# Patient Record
Sex: FEMALE | Race: OTHER | HISPANIC OR LATINO | URBAN - METROPOLITAN AREA
[De-identification: names, ages, dates, MRNs, and addresses within clinical notes are randomized per-mention and may not be internally consistent; named-entity substitution may affect disease eponyms.]

---

## 2024-09-06 ENCOUNTER — EMERGENCY (EMERGENCY)
Facility: HOSPITAL | Age: 49
LOS: 1 days | Discharge: HOME | End: 2024-09-06
Attending: EMERGENCY MEDICINE | Admitting: EMERGENCY MEDICINE
Payer: MEDICAID

## 2024-09-06 VITALS
SYSTOLIC BLOOD PRESSURE: 171 MMHG | HEART RATE: 101 BPM | RESPIRATION RATE: 18 BRPM | OXYGEN SATURATION: 100 % | DIASTOLIC BLOOD PRESSURE: 101 MMHG | TEMPERATURE: 98 F

## 2024-09-06 VITALS
RESPIRATION RATE: 16 BRPM | SYSTOLIC BLOOD PRESSURE: 139 MMHG | TEMPERATURE: 98 F | OXYGEN SATURATION: 98 % | DIASTOLIC BLOOD PRESSURE: 81 MMHG | HEART RATE: 94 BPM

## 2024-09-06 DIAGNOSIS — L03.115 CELLULITIS OF RIGHT LOWER LIMB: ICD-10-CM

## 2024-09-06 DIAGNOSIS — R50.9 FEVER, UNSPECIFIED: ICD-10-CM

## 2024-09-06 DIAGNOSIS — Z20.822 CONTACT WITH AND (SUSPECTED) EXPOSURE TO COVID-19: ICD-10-CM

## 2024-09-06 DIAGNOSIS — R00.0 TACHYCARDIA, UNSPECIFIED: ICD-10-CM

## 2024-09-06 DIAGNOSIS — L03.116 CELLULITIS OF LEFT LOWER LIMB: ICD-10-CM

## 2024-09-06 LAB
ALBUMIN SERPL ELPH-MCNC: 3.1 G/DL — LOW (ref 3.4–5)
ALP SERPL-CCNC: 83 U/L — SIGNIFICANT CHANGE UP (ref 40–120)
ALT FLD-CCNC: 20 U/L — SIGNIFICANT CHANGE UP (ref 12–42)
ANION GAP SERPL CALC-SCNC: 7 MMOL/L — LOW (ref 9–16)
APPEARANCE UR: CLEAR — SIGNIFICANT CHANGE UP
APTT BLD: 28.6 SEC — SIGNIFICANT CHANGE UP (ref 24.5–35.6)
AST SERPL-CCNC: 41 U/L — HIGH (ref 15–37)
BASOPHILS # BLD AUTO: 0.04 K/UL — SIGNIFICANT CHANGE UP (ref 0–0.2)
BASOPHILS NFR BLD AUTO: 0.4 % — SIGNIFICANT CHANGE UP (ref 0–2)
BILIRUB SERPL-MCNC: 0.4 MG/DL — SIGNIFICANT CHANGE UP (ref 0.2–1.2)
BILIRUB UR-MCNC: NEGATIVE — SIGNIFICANT CHANGE UP
BUN SERPL-MCNC: 20 MG/DL — SIGNIFICANT CHANGE UP (ref 7–23)
CALCIUM SERPL-MCNC: 8.5 MG/DL — SIGNIFICANT CHANGE UP (ref 8.5–10.5)
CHLORIDE SERPL-SCNC: 107 MMOL/L — SIGNIFICANT CHANGE UP (ref 96–108)
CO2 SERPL-SCNC: 26 MMOL/L — SIGNIFICANT CHANGE UP (ref 22–31)
COLOR SPEC: YELLOW — SIGNIFICANT CHANGE UP
CREAT SERPL-MCNC: 0.78 MG/DL — SIGNIFICANT CHANGE UP (ref 0.5–1.3)
DIFF PNL FLD: NEGATIVE — SIGNIFICANT CHANGE UP
EGFR: 93 ML/MIN/1.73M2 — SIGNIFICANT CHANGE UP
EOSINOPHIL # BLD AUTO: 0.26 K/UL — SIGNIFICANT CHANGE UP (ref 0–0.5)
EOSINOPHIL NFR BLD AUTO: 2.5 % — SIGNIFICANT CHANGE UP (ref 0–6)
GLUCOSE SERPL-MCNC: 90 MG/DL — SIGNIFICANT CHANGE UP (ref 70–99)
GLUCOSE UR QL: NEGATIVE MG/DL — SIGNIFICANT CHANGE UP
HCG SERPL-ACNC: <1 MIU/ML — SIGNIFICANT CHANGE UP
HCT VFR BLD CALC: 35.5 % — SIGNIFICANT CHANGE UP (ref 34.5–45)
HGB BLD-MCNC: 11.4 G/DL — LOW (ref 11.5–15.5)
IMM GRANULOCYTES NFR BLD AUTO: 0.5 % — SIGNIFICANT CHANGE UP (ref 0–0.9)
INR BLD: 1.05 — SIGNIFICANT CHANGE UP (ref 0.85–1.18)
KETONES UR-MCNC: NEGATIVE MG/DL — SIGNIFICANT CHANGE UP
LACTATE BLDV-MCNC: 0.9 MMOL/L — SIGNIFICANT CHANGE UP (ref 0.5–2)
LEUKOCYTE ESTERASE UR-ACNC: NEGATIVE — SIGNIFICANT CHANGE UP
LYMPHOCYTES # BLD AUTO: 1.52 K/UL — SIGNIFICANT CHANGE UP (ref 1–3.3)
LYMPHOCYTES # BLD AUTO: 14.6 % — SIGNIFICANT CHANGE UP (ref 13–44)
MCHC RBC-ENTMCNC: 28.6 PG — SIGNIFICANT CHANGE UP (ref 27–34)
MCHC RBC-ENTMCNC: 32.1 GM/DL — SIGNIFICANT CHANGE UP (ref 32–36)
MCV RBC AUTO: 89.2 FL — SIGNIFICANT CHANGE UP (ref 80–100)
MONOCYTES # BLD AUTO: 0.69 K/UL — SIGNIFICANT CHANGE UP (ref 0–0.9)
MONOCYTES NFR BLD AUTO: 6.6 % — SIGNIFICANT CHANGE UP (ref 2–14)
NEUTROPHILS # BLD AUTO: 7.88 K/UL — HIGH (ref 1.8–7.4)
NEUTROPHILS NFR BLD AUTO: 75.4 % — SIGNIFICANT CHANGE UP (ref 43–77)
NITRITE UR-MCNC: NEGATIVE — SIGNIFICANT CHANGE UP
NRBC # BLD: 0 /100 WBCS — SIGNIFICANT CHANGE UP (ref 0–0)
NT-PROBNP SERPL-SCNC: 18 PG/ML — SIGNIFICANT CHANGE UP
PH UR: 8 — SIGNIFICANT CHANGE UP (ref 5–8)
PLATELET # BLD AUTO: 291 K/UL — SIGNIFICANT CHANGE UP (ref 150–400)
POTASSIUM SERPL-MCNC: 5 MMOL/L — SIGNIFICANT CHANGE UP (ref 3.5–5.3)
POTASSIUM SERPL-SCNC: 5 MMOL/L — SIGNIFICANT CHANGE UP (ref 3.5–5.3)
PROT SERPL-MCNC: 7 G/DL — SIGNIFICANT CHANGE UP (ref 6.4–8.2)
PROT UR-MCNC: NEGATIVE MG/DL — SIGNIFICANT CHANGE UP
PROTHROM AB SERPL-ACNC: 11.8 SEC — SIGNIFICANT CHANGE UP (ref 9.5–13)
RBC # BLD: 3.98 M/UL — SIGNIFICANT CHANGE UP (ref 3.8–5.2)
RBC # FLD: 15.9 % — HIGH (ref 10.3–14.5)
SODIUM SERPL-SCNC: 140 MMOL/L — SIGNIFICANT CHANGE UP (ref 132–145)
SP GR SPEC: 1.01 — SIGNIFICANT CHANGE UP (ref 1–1.03)
UROBILINOGEN FLD QL: 0.2 MG/DL — SIGNIFICANT CHANGE UP (ref 0.2–1)
WBC # BLD: 10.44 K/UL — SIGNIFICANT CHANGE UP (ref 3.8–10.5)
WBC # FLD AUTO: 10.44 K/UL — SIGNIFICANT CHANGE UP (ref 3.8–10.5)

## 2024-09-06 PROCEDURE — 93970 EXTREMITY STUDY: CPT | Mod: 26

## 2024-09-06 PROCEDURE — 99285 EMERGENCY DEPT VISIT HI MDM: CPT

## 2024-09-06 PROCEDURE — 71046 X-RAY EXAM CHEST 2 VIEWS: CPT | Mod: 26

## 2024-09-06 RX ORDER — SODIUM CHLORIDE 9 MG/ML
1550 INJECTION INTRAMUSCULAR; INTRAVENOUS; SUBCUTANEOUS ONCE
Refills: 0 | Status: COMPLETED | OUTPATIENT
Start: 2024-09-06 | End: 2024-09-06

## 2024-09-06 RX ORDER — ACETAMINOPHEN 325 MG/1
650 TABLET ORAL ONCE
Refills: 0 | Status: COMPLETED | OUTPATIENT
Start: 2024-09-06 | End: 2024-09-06

## 2024-09-06 RX ORDER — CEPHALEXIN 500 MG
1 CAPSULE ORAL
Qty: 15 | Refills: 0
Start: 2024-09-06 | End: 2024-09-10

## 2024-09-06 RX ORDER — VANCOMYCIN/0.9 % SOD CHLORIDE 1.75G/25
1000 PLASTIC BAG, INJECTION (ML) INTRAVENOUS ONCE
Refills: 0 | Status: COMPLETED | OUTPATIENT
Start: 2024-09-06 | End: 2024-09-06

## 2024-09-06 RX ORDER — ACETAMINOPHEN 325 MG/1
2 TABLET ORAL
Qty: 30 | Refills: 0
Start: 2024-09-06 | End: 2024-09-10

## 2024-09-06 RX ORDER — IBUPROFEN 600 MG
1 TABLET ORAL
Qty: 15 | Refills: 0
Start: 2024-09-06 | End: 2024-09-10

## 2024-09-06 RX ORDER — SULFAMETHOXAZOLE AND TRIMETHOPRIM 800; 160 MG/1; MG/1
1 TABLET ORAL
Qty: 10 | Refills: 0
Start: 2024-09-06 | End: 2024-09-10

## 2024-09-06 RX ADMIN — ACETAMINOPHEN 650 MILLIGRAM(S): 325 TABLET ORAL at 19:22

## 2024-09-06 RX ADMIN — SODIUM CHLORIDE 1550 MILLILITER(S): 9 INJECTION INTRAMUSCULAR; INTRAVENOUS; SUBCUTANEOUS at 15:44

## 2024-09-06 RX ADMIN — Medication 100 MILLIGRAM(S): at 15:44

## 2024-09-06 RX ADMIN — SODIUM CHLORIDE 1550 MILLILITER(S): 9 INJECTION INTRAMUSCULAR; INTRAVENOUS; SUBCUTANEOUS at 19:22

## 2024-09-06 RX ADMIN — ACETAMINOPHEN 650 MILLIGRAM(S): 325 TABLET ORAL at 15:44

## 2024-09-06 RX ADMIN — Medication 250 MILLIGRAM(S): at 19:42

## 2024-09-06 RX ADMIN — Medication 1000 MILLIGRAM(S): at 19:22

## 2024-09-06 NOTE — ED ADULT NURSE NOTE - IS THE PATIENT ABLE TO BE SCREENED?
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

## 2024-09-06 NOTE — ED PROVIDER NOTE - CLINICAL SUMMARY MEDICAL DECISION MAKING FREE TEXT BOX
Patient here with bilateral lower extremity cellulitis.      Lactic negative.  WBC normal.    Wet read chest x-ray with no acute findings.  Urine negative.  DVT bilat LE, neg for DVT.     ED findings was discussed with the patient in depth and there was understanding of the results.        Suspect overall constitutional symptoms due to bilateral lower extremity cellulitis.    Patient given Rocephin and vancomycin as well as fluids.      I personally performed 3 separate reevaluation's during the patient's ED visit.  The patient did improved and remained stable throughout the ED visit    Discussed signs and symptoms to immediately return to the ER. Discussed plan, home care instructions, and follow up. Patient feels comfortable with discharge at this time, understands when to return and follow up, agrees with plan of care as discussed, stable for discharge. Patient here with bilateral lower extremity cellulitis.      Lactic negative.  WBC normal.    Wet read chest x-ray with no acute findings.  Urine negative.  US of bilat LE, neg for DVT.     ED findings was discussed with the patient in depth and there was understanding of the results.        Suspect overall constitutional symptoms due to bilateral lower extremity cellulitis.    Patient given Rocephin and vancomycin as well as fluids.    I personally performed 3 separate reevaluation's during the patient's ED visit.  The patient did improved and remained stable throughout the ED visit    Discussed signs and symptoms to immediately return to the ER. Discussed plan, home care instructions, and follow up. Patient feels comfortable with discharge at this time, understands when to return and follow up, agrees with plan of care as discussed, stable for discharge.

## 2024-09-06 NOTE — ED ADULT NURSE NOTE - NSFALLUNIVINTERV_ED_ALL_ED
Bed/Stretcher in lowest position, wheels locked, appropriate side rails in place/Call bell, personal items and telephone in reach/Instruct patient to call for assistance before getting out of bed/chair/stretcher/Non-slip footwear applied when patient is off stretcher/[place] to call system/Physically safe environment - no spills, clutter or unnecessary equipment/Purposeful proactive rounding/Room/bathroom lighting operational, light cord in reach

## 2024-09-06 NOTE — ED PROVIDER NOTE - PHYSICAL EXAMINATION
CONSTITUTIONAL   General appearance: looks well, no acute distress, alert, interactive, pleasant, answers questions appropriately    EYES   RIGHT eye: Normal Conjunctiva and Lid   LEFT eye: Normal Conjunctiva and Lid   Sclerae: NO subconjunctival hemorrhage, No scleral icterus   PUPILS: PERRL   EOM: EOMI     EAR / NOSE / THROAT   Moist mucous membranes      NECK   Supple, no meningismus, trachea midline, no masses, full ROM     CARDIOVASCULAR   Heart Auscultation: RRR, Normal S1, S2 heart sounds, No murmurs, rubs or gallops   Distal pulses palpable     LUNGS   Auscultation: breath sounds normal, good air movement, NO wheezing, NO rales or crackles, NO rhonchi or coarse BS   Respiratory effort: No respiratory distress, normal respiration effort, speaking in Full Sentences     ABDOMEN   Soft, NO tenderness, NO mass or distension, NO guarding or rebound, normal bowel sounds, negative Smith's sign, NO tenderness RLQ, NO organomegaly, No flank tenderness, NO pulsatile mass     LYMPHATIC   NO Anterior cervical adenopathy, NO Posterior cervical adenopathy     SKIN   Normal color, NO rash, NO erythema, NO bruising, NO skin lesion     MSK   bilat  lower extremity swelling right greater than left with warmth.  Mild TTP to the right calf.  Distal pulses intact.  Brisk cap refill.  No obvious deformity.      PSYCHIATRIC   Judgement and insight intact, normal mood and affect, patient at baseline MS     NEUROLOGIC    Alert and oriented x 3, Speech normal, No focal deficits, normal motor and sensory, MAEx4 CONSTITUTIONAL   General appearance: looks well, no acute distress, alert, interactive, pleasant, answers questions appropriately    EYES   RIGHT eye: Normal Conjunctiva and Lid   LEFT eye: Normal Conjunctiva and Lid   Sclerae: NO subconjunctival hemorrhage, No scleral icterus   PUPILS: PERRL   EOM: EOMI     EAR / NOSE / THROAT   Moist mucous membranes      NECK   Supple, no meningismus, trachea midline, no masses, full ROM     CARDIOVASCULAR   Heart Auscultation: RRR, Normal S1, S2 heart sounds, No murmurs, rubs or gallops   Distal pulses palpable     LUNGS   Auscultation: breath sounds normal, good air movement, NO wheezing, NO rales or crackles, NO rhonchi or coarse BS   Respiratory effort: No respiratory distress, normal respiration effort, speaking in Full Sentences     ABDOMEN   Soft, NO tenderness, NO mass or distension, NO guarding or rebound, normal bowel sounds, negative Smith's sign, NO tenderness RLQ, NO organomegaly, No flank tenderness, NO pulsatile mass     LYMPHATIC   NO Anterior cervical adenopathy, NO Posterior cervical adenopathy     SKIN   Normal color, NO rash, NO erythema, NO bruising, NO skin lesion     MSK   bilat  lower extremity swelling (right greater than left) with blanchable erythema and warmth.  Mild TTP to the right calf.  Distal pulses intact.  Brisk cap refill.  No obvious deformity.    PSYCHIATRIC   Judgement and insight intact, normal mood and affect, patient at baseline MS     NEUROLOGIC    Alert and oriented x 3, Speech normal, No focal deficits, normal motor and sensory, MAEx4 CONSTITUTIONAL   General appearance: looks well, no acute distress, alert, interactive, pleasant, answers questions appropriately    EYES   RIGHT eye: Normal Conjunctiva and Lid   LEFT eye: Normal Conjunctiva and Lid   Sclerae: NO subconjunctival hemorrhage, No scleral icterus   PUPILS: PERRL   EOM: EOMI     EAR / NOSE / THROAT   Moist mucous membranes      NECK   Supple, no meningismus, trachea midline, no masses, full ROM     CARDIOVASCULAR   Heart Auscultation: RRR, Normal S1, S2 heart sounds, No murmurs, rubs or gallops   Distal pulses palpable     LUNGS   Auscultation: breath sounds normal, good air movement, NO wheezing, NO rales or crackles, NO rhonchi or coarse BS   Respiratory effort: No respiratory distress, normal respiration effort, speaking in Full Sentences     ABDOMEN   Soft, NO tenderness, NO mass or distension, NO guarding or rebound, normal bowel sounds, negative Smith's sign, NO tenderness RLQ, NO organomegaly, No flank tenderness, NO pulsatile mass     LYMPHATIC   NO Anterior cervical adenopathy, NO Posterior cervical adenopathy     SKIN   See below.  No lacs, abrasions, or ecchymoses.     MSK   bilat  lower extremity swelling (right greater than left) with blanchable erythema and warmth.  Mild TTP to the right calf.  Distal pulses intact.  Brisk cap refill.  No obvious deformity.    PSYCHIATRIC   Judgement and insight intact, normal mood and affect, patient at baseline MS     NEUROLOGIC    Alert and oriented x 3, Speech normal, No focal deficits, normal motor and sensory, MAEx4

## 2024-09-06 NOTE — ED ADULT NURSE NOTE - OBJECTIVE STATEMENT
Pt is a 49y female complaining of headache and b/l leg swelling a 1 week. Pt complaining of pain to left ankle. Denies any known injury. Denies chest pain and sob.

## 2024-09-06 NOTE — ED PROVIDER NOTE - ATTENDING APP SHARED VISIT CONTRIBUTION OF CARE
Patient is a 49-year-old female who denies any past medical history and is here for bilateral lower extremity swelling, bilateral lower extremity pain, fevers, chills, and body aches for the past week.  Patient reports lower extremity swelling at baseline but has worsened over the past week.  She noticed redness and swelling going up from ankles to knees.  This has been associated with bilateral pressure type pain.  Also describes a burning type pain.  She recently developed intermittent fevers and chills and body aches.  Review of systems is otherwise negative. Patient is a 49-year-old female who denies any past medical history and is here for bilateral lower extremity swelling, bilateral lower extremity pain, fevers, chills, and body aches for the past week.  Patient reports lower extremity swelling at baseline but has worsened over the past week.  She noticed redness and swelling going up from ankles to knees.  This has been associated with bilateral pressure type pain.  Also describes a burning type pain.  She recently developed intermittent fevers and chills and body aches.  Review of systems is otherwise negative.  Agree with ACP exam as above.   A/P - B/L LE cellulitis.  US negative for DVT.  Pain controlled with percocet.  Started on broad spectrum abx here.  Pt improved and stable for d/c.  Will give strict instructions to f/u with her PCP.  Will give strict return precautions.

## 2024-09-06 NOTE — ED PROVIDER NOTE - PATIENT PORTAL LINK FT
You can access the FollowMyHealth Patient Portal offered by [facility] by registering at the following website: [email or web address]. By joining [employer]’s FollowMyHealth portal, you will also be able to view your health information using other applications (apps) compatible with our system.

## 2024-09-06 NOTE — ED PROVIDER NOTE - NS ED ROS FT
REVIEW OF SYSTEMS:    CONSTITUTIONAL: no recent weight loss, night sweats   HEENT: no eye pain, nasal congestion, rhinorrhea, sore throat   CV: no chest pain, palpitations   PULM: no coughing, SOA, wheezes, pleuritic pain, hemoptysis    GI: no abd pain, n/v/d, constipation, hematemesis, bloody stools, dark/tarry stools   : no flank pain, dysuria, hematuria    MS: no neck pain, back pain   SKIN: no rash   NEURO: no photophobia, phonophobia, vision changes, focal weakness, numbness/tingling, syncope   PSYCH: no SI/HI

## 2024-09-06 NOTE — ED PROVIDER NOTE - OBJECTIVE STATEMENT
49-year-old female patient with no past medical history here for 1 week history of fevers, chills, body aches, headaches, and bilateral lower extremity pain and swelling worse on right lower extremity.  Patient denies any chest pain, shortness of air, abdominal pain, nausea vomiting diarrhea or any other symptoms or concerns. 49-year-old female patient with no past medical history here for 1 week history of fevers, chills, body aches, headaches, and bilateral lower extremity pain/redness/swelling and swelling worse on right lower extremity.  Patient denies any chest pain, shortness of air, abdominal pain, nausea vomiting diarrhea or any other symptoms or concerns.

## 2024-09-07 LAB
RAPID RVP RESULT: SIGNIFICANT CHANGE UP
SARS-COV-2 RNA SPEC QL NAA+PROBE: SIGNIFICANT CHANGE UP

## 2024-09-12 LAB
CULTURE RESULTS: SIGNIFICANT CHANGE UP
CULTURE RESULTS: SIGNIFICANT CHANGE UP
SPECIMEN SOURCE: SIGNIFICANT CHANGE UP
SPECIMEN SOURCE: SIGNIFICANT CHANGE UP
